# Patient Record
Sex: FEMALE | Race: WHITE | Employment: FULL TIME | URBAN - METROPOLITAN AREA
[De-identification: names, ages, dates, MRNs, and addresses within clinical notes are randomized per-mention and may not be internally consistent; named-entity substitution may affect disease eponyms.]

---

## 2018-10-07 ENCOUNTER — APPOINTMENT (OUTPATIENT)
Dept: GENERAL RADIOLOGY | Age: 28
End: 2018-10-07
Attending: PHYSICIAN ASSISTANT
Payer: COMMERCIAL

## 2018-10-07 ENCOUNTER — HOSPITAL ENCOUNTER (EMERGENCY)
Age: 28
Discharge: HOME | End: 2018-10-07
Attending: EMERGENCY MEDICINE
Payer: COMMERCIAL

## 2018-10-07 VITALS
TEMPERATURE: 98.7 F | HEART RATE: 83 BPM | DIASTOLIC BLOOD PRESSURE: 92 MMHG | OXYGEN SATURATION: 100 % | HEIGHT: 67 IN | RESPIRATION RATE: 18 BRPM | SYSTOLIC BLOOD PRESSURE: 147 MMHG | BODY MASS INDEX: 30.55 KG/M2 | WEIGHT: 194.67 LBS

## 2018-10-07 DIAGNOSIS — M25.572 ACUTE LEFT ANKLE PAIN: ICD-10-CM

## 2018-10-07 DIAGNOSIS — S93.401A SPRAIN OF RIGHT ANKLE, UNSPECIFIED LIGAMENT, INITIAL ENCOUNTER: Primary | ICD-10-CM

## 2018-10-07 PROCEDURE — 99283 EMERGENCY DEPT VISIT LOW MDM: CPT

## 2018-10-07 PROCEDURE — 73610 X-RAY EXAM OF ANKLE: CPT

## 2018-10-07 RX ORDER — ACETAMINOPHEN 325 MG/1
650 TABLET ORAL
Status: DISCONTINUED | OUTPATIENT
Start: 2018-10-07 | End: 2018-10-07 | Stop reason: HOSPADM

## 2018-10-07 NOTE — ED PROVIDER NOTES
EMERGENCY DEPARTMENT HISTORY AND PHYSICAL EXAM 
 
Date: 10/7/2018 Patient Name: Yasmani Pierson History of Presenting Illness Chief Complaint Patient presents with  Ankle Injury  
  bilateral ankle pain with right ankle pain worse secondary to trip and fall today History Provided By: Patient and Patient's Father HPI: Yasmani Pierson is a 33 y.o. female with a PMH of ADHD who presents with bilateral ankle pain with right >Left secondary to a fall that occurred when pt stepped off a sidewalk today afternoon. Pt denies any head trauma, prior injury to her ankle, weakness in legs, paresthesia, numbness, loc. Pt has not self medicated Pt is ambulatory All other ROS negative at this time Pt is in no acute distress and is speaking in full sentences PCP: Tk Hi MD 
 
Current Facility-Administered Medications Medication Dose Route Frequency Provider Last Rate Last Dose  acetaminophen (TYLENOL) tablet 650 mg  650 mg Oral NOW PATRIC Mckeon Current Outpatient Prescriptions Medication Sig Dispense Refill  ibuprofen (MOTRIN) 600 mg tablet Take 1 Tab by mouth every eight (8) hours as needed for Pain. 30 Tab 0  
 polyethylene glycol (MIRALAX) 17 gram/dose powder Take 17 g by mouth daily. 255 g 0  
 traZODone (DESYREL) 100 mg tablet Take 50 mg by mouth nightly.  sertraline (ZOLOFT) 100 mg tablet Take 150 mg by mouth daily. Past History Past Medical History: 
Past Medical History:  
Diagnosis Date  ADD (attention deficit disorder) 4/5/2010  Allergic rhinitis due to other allergen  Strabismus Past Surgical History: 
Past Surgical History:  
Procedure Laterality Date  HX HEENT    
 wisdom teeth, addenoids Family History: 
Family History Problem Relation Age of Onset  High Cholesterol Mother  Hypertension Mother  Thyroid Disease Sister  Other Sister Gevena Petties  High Cholesterol Maternal Grandfather  Heart Disease Maternal Grandfather  Arthritis-rheumatoid Maternal Grandfather Social History: 
Social History Substance Use Topics  Smoking status: Current Some Day Smoker  Smokeless tobacco: Not on file  Alcohol use Yes Comment: occassional  
 
 
Allergies: Allergies Allergen Reactions  Bactrim [Sulfamethoxazole-Trimethoprim] Rash  Ceclor [Cefaclor] Swelling Review of Systems Review of Systems Physical Exam  
 
Vitals:  
 10/07/18 1456 BP: (!) 147/92 Pulse: 83 Resp: 18 Temp: 98.7 °F (37.1 °C) SpO2: 100% Weight: 88.3 kg (194 lb 10.7 oz) Height: 5' 7\" (1.702 m) Physical Exam  
Constitutional: She is oriented to person, place, and time. She appears well-developed and well-nourished. No distress. HENT:  
Head: Normocephalic and atraumatic. Right Ear: External ear normal.  
Left Ear: External ear normal.  
Nose: Nose normal.  
Mouth/Throat: Oropharynx is clear and moist. No oropharyngeal exudate. Eyes: Conjunctivae and EOM are normal. Pupils are equal, round, and reactive to light. Neck: Normal range of motion. Neck supple. No tracheal deviation present. Cardiovascular: Normal rate, regular rhythm, normal heart sounds and intact distal pulses. Pulmonary/Chest: Effort normal and breath sounds normal. No respiratory distress. She has no wheezes. Abdominal: Soft. Bowel sounds are normal. She exhibits no distension. There is no tenderness. There is no rebound, no CVA tenderness, no tenderness at McBurney's point and negative Thrasher's sign. Musculoskeletal: She exhibits edema and tenderness. She exhibits no deformity. Right ankle: She exhibits decreased range of motion, swelling and ecchymosis. She exhibits no laceration and normal pulse. Tenderness. Lateral malleolus tenderness found.  No medial malleolus, no AITFL, no CF ligament, no posterior TFL and no head of 5th metatarsal tenderness found. Achilles tendon normal. Achilles tendon exhibits no pain, no defect and normal Patel's test results. Left ankle: She exhibits normal range of motion, no swelling, no ecchymosis, no deformity, no laceration and normal pulse. Tenderness. Lateral malleolus tenderness found. No medial malleolus, no AITFL, no CF ligament, no posterior TFL, no head of 5th metatarsal and no proximal fibula tenderness found. Achilles tendon normal.  
     Right foot: Normal. There is normal range of motion, no tenderness, no bony tenderness, no swelling, normal capillary refill, no crepitus, no deformity and no laceration. Left foot: Normal. There is normal range of motion, no tenderness, no bony tenderness, no swelling, normal capillary refill, no crepitus, no deformity and no laceration. Lymphadenopathy:  
  She has no cervical adenopathy. Neurological: She is alert and oriented to person, place, and time. She has normal reflexes. She displays normal reflexes. No cranial nerve deficit. She exhibits normal muscle tone. Coordination normal.  
Skin: Skin is warm and dry. She is not diaphoretic. No pallor. Psychiatric: She has a normal mood and affect. Her behavior is normal. Judgment and thought content normal.  
Nursing note and vitals reviewed. Diagnostic Study Results Labs - No results found for this or any previous visit (from the past 12 hour(s)). Radiologic Studies -  
XR ANKLE LT MIN 3 V Final Result XR ANKLE RT MIN 3 V Final Result CT Results  (Last 48 hours) None CXR Results  (Last 48 hours) None Medical Decision Making I am the first provider for this patient. I reviewed the vital signs, available nursing notes, past medical history, past surgical history, family history and social history. Vital Signs-Reviewed the patient's vital signs. Records Reviewed: Nursing Notes, Old Medical Records, Previous Radiology Studies and Previous Laboratory Studies ED Course:  
 
Disposition: 
Discharge DISCHARGE NOTE:  
Care plan outlined and precautions discussed. Patient has no new complaints, changes, or physical findings. Results of visit were reviewed with the patient. All medications were reviewed with the patient; will d/c home. All of pt's questions and concerns were addressed. Patient was instructed and agrees to follow up with pcp, as well as to return to the ED upon further deterioration. Patient is ready to go home. Follow-up Information Follow up With Details Comments Contact [facility] Schedule an appointment as soon as possible for a visit in 3 days If symptoms worsen [address] [address] [address] [facility] [address] 
[phone] Current Discharge Medication List  
  
 
 
Provider Notes (Medical Decision Making):  
Pt instructed to rest and RICE Pt has crutches at home Worsening si/sxs discussed extensively Follow up with PCP or RTC if symptoms/signs worsen Side effects of medication discussed Education materials provided at discharge Pt verbalizes agreement with plan 
 
Procedures: 
Procedures Procedure Note - Ace Wrap Placement: 
3:22 PM 
Performed by: PATRIC Amaro Neurovascularly intact prior to tx. An Ace Wrap was placed on pt's right and left ankle. Joint was placed in neutral position. Neurovascularly intact after tx. The procedure took 1-15 minutes, and pt tolerated well. Diagnosis Clinical Impression: 1. Sprain of right ankle, unspecified ligament, initial encounter 2. Acute left ankle pain

## 2018-10-07 NOTE — ED NOTES
Katherin Corral reviewed discharge instructions with the patient. The patient verbalized understanding. All questions and concerns were addressed. The patient declined a wheelchair and is discharged ambulatory in the care of family members with instructions and prescriptions in hand. Pt is alert and oriented x 4. Respirations are clear and unlabored.

## 2018-10-07 NOTE — DISCHARGE INSTRUCTIONS
Ankle Sprain: Care Instructions  Your Care Instructions    An ankle sprain can happen when you twist your ankle. The ligaments that support the ankle can get stretched and torn. Often the ankle is swollen and painful. Ankle sprains may take from several weeks to several months to heal. Usually, the more pain and swelling you have, the more severe your ankle sprain is and the longer it will take to heal. You can heal faster and regain strength in your ankle with good home treatment. It is very important to give your ankle time to heal completely, so that you do not easily hurt your ankle again. Follow-up care is a key part of your treatment and safety. Be sure to make and go to all appointments, and call your doctor if you are having problems. It's also a good idea to know your test results and keep a list of the medicines you take. How can you care for yourself at home? · Prop up your foot on pillows as much as possible for the next 3 days. Try to keep your ankle above the level of your heart. This will help reduce the swelling. · Follow your doctor's directions for wearing a splint or elastic bandage. Wrapping the ankle may help reduce or prevent swelling. · Your doctor may give you a splint, a brace, an air stirrup, or another form of ankle support to protect your ankle until it is healed. Wear it as directed while your ankle is healing. Do not remove it unless your doctor tells you to. After your ankle has healed, ask your doctor whether you should wear the brace when you exercise. · Put ice or cold packs on your injured ankle for 10 to 20 minutes at a time. Try to do this every 1 to 2 hours for the next 3 days (when you are awake) or until the swelling goes down. Put a thin cloth between the ice and your skin. · You may need to use crutches until you can walk without pain. If you do use crutches, try to bear some weight on your injured ankle if you can do so without pain.  This helps the ankle heal.  · Take pain medicines exactly as directed. ¨ If the doctor gave you a prescription medicine for pain, take it as prescribed. ¨ If you are not taking a prescription pain medicine, ask your doctor if you can take an over-the-counter medicine. · If you have been given ankle exercises to do at home, do them exactly as instructed. These can promote healing and help prevent lasting weakness. When should you call for help? Call your doctor now or seek immediate medical care if:    · Your pain is getting worse.     · Your swelling is getting worse.     · Your splint feels too tight or you are unable to loosen it.    Watch closely for changes in your health, and be sure to contact your doctor if:    · You are not getting better after 1 week. Where can you learn more? Go to http://oscar-tricia.info/. Enter K802 in the search box to learn more about \"Ankle Sprain: Care Instructions. \"  Current as of: November 29, 2017  Content Version: 11.8  © 0929-9934 [employer]. Care instructions adapted under license by [employer] (which disclaims liability or warranty for this information). If you have questions about a medical condition or this instruction, always ask your healthcare professional. [place] [address] any warranty or liability for your use of this information.

## 2023-01-27 ENCOUNTER — NURSE TRIAGE (OUTPATIENT)
Dept: OTHER | Facility: CLINIC | Age: 33
End: 2023-01-27

## 2023-01-27 NOTE — TELEPHONE ENCOUNTER
Location of patient: [address] kirk from [place] at [facility] with [employer]; Patient with Red Flag Complaint requesting to establish care with [facility] 3rd floor w/Dr. Minna Kumari. Subjective: Caller states \"I've had headaches for years but have gotten worse in the last 3-4 weeks. Has been dx with occular migraines. \"    Current Symptoms: headache, 1/10    Denies: N/V, vision changes, anxiety, SI or HI, cold symptoms    Onset: several years ago; worsening -increasing in frequency and pain    Associated Symptoms: reduced activity, increased sleepiness, anxiety/stress    Pain Severity: 1/10; aching and dull; intermittent - headache    Temperature: denies fever    What has been tried: ibuprofen, left over muscle relaxer (last taken 1/25 evening)    LMP:  no periods on depo  Pregnant: No    Recommended disposition: See in Office Within 2 Weeks; if unable to be seen within 2 weeks, instructed pt to proceed to [facility]/Walk-In or ER (as last resort)    Care advice provided, patient verbalizes understanding; denies any other questions or concerns; instructed to call back for any new or worsening symptoms. Carmelina De Dios at [facility] calling patient to schedule    Attention Provider: Thank you for allowing me to participate in the care of your patient. The patient was connected to triage in response to information provided to the [facility]. Please do not respond through this encounter as the response is not directed to a shared pool.       Reason for Disposition   Headache is a chronic symptom (recurrent or ongoing AND present > 4 weeks)    Protocols used: Headache-ADULT-